# Patient Record
Sex: FEMALE | Race: BLACK OR AFRICAN AMERICAN | NOT HISPANIC OR LATINO | URBAN - METROPOLITAN AREA
[De-identification: names, ages, dates, MRNs, and addresses within clinical notes are randomized per-mention and may not be internally consistent; named-entity substitution may affect disease eponyms.]

---

## 2019-01-03 ENCOUNTER — EMERGENCY (EMERGENCY)
Facility: HOSPITAL | Age: 34
LOS: 1 days | Discharge: HOME | End: 2019-01-03
Attending: EMERGENCY MEDICINE | Admitting: EMERGENCY MEDICINE
Payer: SELF-PAY

## 2019-01-03 VITALS
DIASTOLIC BLOOD PRESSURE: 74 MMHG | HEART RATE: 80 BPM | RESPIRATION RATE: 18 BRPM | SYSTOLIC BLOOD PRESSURE: 119 MMHG | OXYGEN SATURATION: 99 % | WEIGHT: 187.17 LBS | TEMPERATURE: 99 F

## 2019-01-03 DIAGNOSIS — R07.89 OTHER CHEST PAIN: ICD-10-CM

## 2019-01-03 DIAGNOSIS — M54.5 LOW BACK PAIN: ICD-10-CM

## 2019-01-03 PROCEDURE — 71046 X-RAY EXAM CHEST 2 VIEWS: CPT | Mod: 26

## 2019-01-03 PROCEDURE — 71046 X-RAY EXAM CHEST 2 VIEWS: CPT

## 2019-01-03 PROCEDURE — 99284 EMERGENCY DEPT VISIT MOD MDM: CPT

## 2019-01-03 PROCEDURE — 99283 EMERGENCY DEPT VISIT LOW MDM: CPT

## 2019-01-03 RX ORDER — ESOMEPRAZOLE MAGNESIUM 40 MG/1
1 CAPSULE, DELAYED RELEASE ORAL
Qty: 14 | Refills: 0 | OUTPATIENT
Start: 2019-01-03 | End: 2019-01-16

## 2019-01-03 RX ORDER — FAMOTIDINE 10 MG/ML
20 INJECTION INTRAVENOUS ONCE
Qty: 0 | Refills: 0 | Status: COMPLETED | OUTPATIENT
Start: 2019-01-03 | End: 2019-01-03

## 2019-01-03 RX ORDER — IBUPROFEN 200 MG
600 TABLET ORAL ONCE
Qty: 0 | Refills: 0 | Status: COMPLETED | OUTPATIENT
Start: 2019-01-03 | End: 2019-01-03

## 2019-01-03 RX ADMIN — Medication 30 MILLILITER(S): at 19:59

## 2019-01-03 RX ADMIN — FAMOTIDINE 20 MILLIGRAM(S): 10 INJECTION INTRAVENOUS at 19:59

## 2019-01-03 RX ADMIN — Medication 600 MILLIGRAM(S): at 19:59

## 2019-01-03 NOTE — ED PROVIDER NOTE - ATTENDING CONTRIBUTION TO CARE
Pt w/ no sig PMHx p/w burning in the center of her chest s/p drinking soda 2 days ago, intermittent, w/ associated epigastric discomfort. No n/v. No f/c. No diarrhea or constipation. No SOB, diaphoresis, or palpitations. No recent travel / immobilization / surgery. No hx PE / DVT / CA. Former smoker. Pt w/ no sig PMHx p/w burning in the center of her chest s/p drinking soda 2 days ago, intermittent, w/ associated epigastric discomfort. No n/v. No f/c. No diarrhea or constipation. No SOB, diaphoresis, or palpitations. No recent travel / immobilization / surgery. No hx PE / DVT / CA. No hemoptysis, LE edema or calf ttp. No exogenous hormone use. Former smoker. No hx CAD. No drug use. No sig FHx CAD or sudden death.   Constitutional: Well appearing, well nourished, awake, alert, oriented to person, place, time/situation and in no apparent distress.  ENMT: Airway patent. Normal MM  Eyes: Clear bilaterally  Cardiac: Normal rate, regular rhythm.  Heart sounds S1, S2.  No murmurs, rubs or gallops. No JVD or LE edema  Respiratory: Breaths sounds equal and clear b/l. No increased WOB, tachypnea, hypoxia, or accessory mm use. Pt speaks in full sentences.   Musculoskeletal: Range of motion is not limited. NO LE edema or calf ttp.   Neuro: Alert and oriented x 3, face symmetric and speech fluent. Strength 5/5 x 4 ext and symmetric, nml gross motor movement, nml gait. No focal deficits noted.  Skin: Skin normal color for race, warm, dry and intact. No evidence of rash.  Psych: Alert and oriented to person, place, time/situation. normal mood and affect. no apparent risk to self or others.   Burning epigastric / chest discomfort c/w gastritis / dyspepsia / PUD. No EKG changes. No risk factors for ACS. PERC neg / [place] low risk. PPI, diet modifications, f/u PCP

## 2019-01-03 NOTE — ED PROVIDER NOTE - OBJECTIVE STATEMENT
34 y/o female with no sig PMHx c/o burning to chest x 2 days. pt states intermittent burning to chest began after drinking soda 2 days ago. pt notes slight epigastric discomfort at times. no fever or chills. no n./v/d. no diarrhea or constipation,. no leg pain or swelling.  no sob or cough. Also pt notes low back pain past 1 wk. no trauma. no numbness, tingling or weakness. no radiation of pain. no incontinence. no further complaints.

## 2019-01-03 NOTE — ED PROVIDER NOTE - MEDICAL DECISION MAKING DETAILS
burning to chest s/p soda. ? GERD. pt well appearing. lungs clear. VSS. ecg no acute changes. cxr no acute pathology. sx's improved with pepcid and maalox. hcg negative. back pain likely muscular,. neuro exam intact. no indication for imaging. pain meds, warm compresses and f/u with pmd

## 2019-01-03 NOTE — ED ADULT NURSE NOTE - NSIMPLEMENTINTERV_GEN_ALL_ED
Implemented All Universal Safety Interventions:  [place] to call system. Call bell, personal items and telephone within reach. Instruct patient to call for assistance. Room bathroom lighting operational. Non-slip footwear when patient is off stretcher. Physically safe environment: no spills, clutter or unnecessary equipment. Stretcher in lowest position, wheels locked, appropriate side rails in place.

## 2019-01-03 NOTE — ED PROVIDER NOTE - MUSCULOSKELETAL, MLM
Spine appears normal, range of motion is not limited, no muscle or joint tenderness strength 5/5 b/l LE. distal sensation intact.

## 2019-01-03 NOTE — ED PROVIDER NOTE - CARE PROVIDER_API CALL
Maverick Cool), Medicine  [address] [facility] [address]  [place] [address]  Phone: [phone]  Fax: [phone]

## 2021-05-20 ENCOUNTER — EMERGENCY (EMERGENCY)
Facility: HOSPITAL | Age: 36
LOS: 1 days | Discharge: HOME | End: 2021-05-20
Admitting: EMERGENCY MEDICINE
Payer: MEDICAID

## 2021-05-20 VITALS
HEART RATE: 85 BPM | HEIGHT: 64 IN | OXYGEN SATURATION: 98 % | SYSTOLIC BLOOD PRESSURE: 138 MMHG | DIASTOLIC BLOOD PRESSURE: 85 MMHG | RESPIRATION RATE: 16 BRPM | TEMPERATURE: 98 F | WEIGHT: 179.9 LBS

## 2021-05-20 DIAGNOSIS — R10.32 LEFT LOWER QUADRANT PAIN: ICD-10-CM

## 2021-05-20 DIAGNOSIS — N83.202 UNSPECIFIED OVARIAN CYST, LEFT SIDE: ICD-10-CM

## 2021-05-20 PROCEDURE — 76830 TRANSVAGINAL US NON-OB: CPT

## 2021-05-20 PROCEDURE — 76856 US EXAM PELVIC COMPLETE: CPT | Mod: 26

## 2021-05-20 PROCEDURE — 76856 US EXAM PELVIC COMPLETE: CPT

## 2021-05-20 PROCEDURE — 99285 EMERGENCY DEPT VISIT HI MDM: CPT

## 2021-05-20 PROCEDURE — 76830 TRANSVAGINAL US NON-OB: CPT | Mod: 26

## 2021-05-20 PROCEDURE — 99284 EMERGENCY DEPT VISIT MOD MDM: CPT | Mod: 25

## 2021-05-20 NOTE — ED PROVIDER NOTE - NSFOLLOWUPINSTRUCTIONS_ED_ALL_ED_FT
Can take tylenol 650mg or motrin 600mg every 6hrs as needed for pain.    Stay well hydrated.    Return for fevers, persistent vomit, uncontrolled OR worsening pain, worsening breathing, worsening lightheaded.    Follow up with primary doctor within 1-2 days.     Follow up with your OBGYN.     Ovarian Cyst    An ovarian cyst is a fluid-filled sac that forms on an ovary. The ovaries are small organs that produce eggs in women. Various types of cysts can form on the ovaries. Some may cause symptoms and require treatment. Most ovarian cysts go away on their own, are not cancerous (are benign), and do not cause problems.    Common types of ovarian cysts include:  Functional (follicle) cysts.  Occur during the menstrual cycle, and usually go away with the next menstrual cycle if you do not get pregnant.  Usually cause no symptoms.  Endometriomas.  Are cysts that form from the tissue that lines the uterus (endometrium).  Are sometimes called “chocolate cysts” because they become filled with blood that turns brown.  Can cause pain in the lower abdomen during intercourse and during your period.  Cystadenoma cysts.  Develop from cells on the outside surface of the ovary.  Can get very large and cause lower abdomen pain and pain with intercourse.  Can cause severe pain if they twist or break open (rupture).  Dermoid cysts.  Are sometimes found in both ovaries.  May contain different kinds of body tissue, such as skin, teeth, hair, or cartilage.  Usually do not cause symptoms unless they get very big.  Theca lutein cysts.  Occur when too much of a certain hormone (human chorionic gonadotropin) is produced and overstimulates the ovaries to produce an egg.  Are most common after having procedures used to assist with the conception of a baby (in vitro fertilization).    What are the causes?  Ovarian cysts may be caused by:  Ovarian hyperstimulation syndrome. This is a condition that can develop from taking fertility medicines. It causes multiple large ovarian cysts to form.  Polycystic ovarian syndrome (PCOS). This is a common hormonal disorder that can cause ovarian cysts, as well as problems with your period or fertility.    What increases the risk?  The following factors may make you more likely to develop ovarian cysts:  Being overweight or obese.  Taking fertility medicines.  Taking certain forms of hormonal birth control.  Smoking.    What are the signs or symptoms?  Many ovarian cysts do not cause symptoms. If symptoms are present, they may include:  Pelvic pain or pressure.  Pain in the lower abdomen.  Pain during sex.  Abdominal swelling.  Abnormal menstrual periods.  Increasing pain with menstrual periods.    How is this diagnosed?  These cysts are commonly found during a routine pelvic exam. You may have tests to find out more about the cyst, such as:  Ultrasound.  X-ray of the pelvis.  CT scan.  MRI.  Blood tests.    How is this treated?  Many ovarian cysts go away on their own without treatment. Your health care provider may want to check your cyst regularly for 2–3 months to see if it changes. If you are in menopause, it is especially important to have your cyst monitored closely because menopausal women have a higher rate of ovarian cancer.    When treatment is needed, it may include:  Medicines to help relieve pain.  A procedure to drain the cyst (aspiration).  Surgery to remove the whole cyst.  Hormone treatment or birth control pills. These methods are sometimes used to help dissolve a cyst.  Follow these instructions at home:  Take over-the-counter and prescription medicines only as told by your health care provider.  Do not drive or use heavy machinery while taking prescription pain medicine.  Get regular pelvic exams and Pap tests as often as told by your health care provider.  Return to your normal activities as told by your health care provider. Ask your health care provider what activities are safe for you.  Do not use any products that contain nicotine or tobacco, such as cigarettes and e-cigarettes. If you need help quitting, ask your health care provider.  Keep all follow-up visits as told by your health care provider. This is important.  Contact a health care provider if:  Your periods are late, irregular, or painful, or they stop.  You have pelvic pain that does not go away.  You have pressure on your bladder or trouble emptying your bladder completely.  You have pain during sex.  You have any of the following in your abdomen:  A feeling of fullness.  Pressure.  Discomfort.  Pain that does not go away.  Swelling.  You feel generally ill.  You become constipated.  You lose your appetite.  You develop severe acne.  You start to have more body hair and facial hair.  You are gaining weight or losing weight without changing your exercise and eating habits.  You think you may be pregnant.  Get help right away if:  You have abdominal pain that is severe or gets worse.  You cannot eat or drink without vomiting.  You suddenly develop a fever.  Your menstrual period is much heavier than usual.

## 2021-05-20 NOTE — ED PROVIDER NOTE - PROGRESS NOTE DETAILS
Arsh: received s/o from ANEESH sanders pending US. Pt currently pain free. prelim US showing "No evidence of ovarian torsion. Mildly enlarged left ovary containing a 3.5 cm hemorrhagic cyst."  Clinically no indication for further emergent ED workup or hospitalization at this time. Comfortable for dc, outpt f/u.

## 2021-05-20 NOTE — ED PROVIDER NOTE - CLINICAL SUMMARY MEDICAL DECISION MAKING FREE TEXT BOX
37 y/o F with no pertinent PmHx presents to the ED c/o left lower back pain and LLQ adnexal pain. Well-appearing on exam. Plan to check for pregnancy. Will use pelvic ultrasound to rule out ectopic pregnancy, ovarian cyst, and ovarian torsion. 37 y/o F with no pertinent PmHx presents to the ED c/o left LLQ/left adnexal pain radiaiting to her lower back, concerned about possible pregnancy. Pt late with her menstruation this month.  Well-appearing on exam, no signs of acute surgical abdomen, no GI symptoms, +left adnexal tenderness.  UCG- negative. pending US  to rule out ovarian cyst/ ovarian torsion.

## 2021-05-20 NOTE — ED ADULT NURSE NOTE - OBJECTIVE STATEMENT
Pt AOX4. Pt c/o  left sided lower back pain that started 3 days ago. Pt denies hematuria, dysuria, n/v, SOB, chest pain, weakness, numbness, constipation, diarrhea. Pt speaking in full complete sentences. Respirations even and unlabored. Abdomen nontender on palpation.

## 2021-05-20 NOTE — ED PROVIDER NOTE - OBJECTIVE STATEMENT
37 y/o F with no pertinent PmHx presents to the ED c/o left lower back pain and LLQ adnexal pain. Missed period, and her at home pregnancy test was question tara positive. Her gynecologist referred her for a pelvic ultrasound, but it's not scheduled yet. The pain worsened over the past three days so she came to the ED. No f/c/n/v/urinary/vag discharge. Seen by GYN last week, all tests done. 37 y/o F with no pertinent PmHx presents to the ED c/o left lower back pain and LLQ adnexal pain. Missed period, and her at home pregnancy test was question tara positive. Her gynecologist referred her for a pelvic ultrasound, but it's not scheduled yet. The pain worsened over the past three days so she came to the ED. No fever, chills, nausea, vomiting, urinary symptoms, or abnormal vaginal discharge. Seen by GYN last week, all tests done. 37 y/o F with no pertinent PmHx presents to the ED c/o left lower back pain and LLQ adnexal pain. Missed period, and her at home pregnancy test was ?positive, reports one line faint.  Her gynecologist referred her for a pelvic ultrasound, but it's not scheduled yet. The pain worsened over the past three days so she came to the ED. No fever, chills, nausea, vomiting, urinary symptoms, or abnormal vaginal discharge. Seen by GYN last week, all tests done. 37 y/o F with no pertinent PmHx presents to the ED c/o left lower back pain and LLQ adnexal pain. Missed period, and her at home pregnancy test was ?positive, reports noted one faint line.  Her gynecologist referred her for a pelvic ultrasound, but it's not scheduled yet. The pain worsened over the past three days so she came to the ED. No fever, chills, nausea, vomiting, urinary symptoms, or abnormal vaginal discharge. Seen by GYN last week, all tests done.

## 2021-05-20 NOTE — ED ADULT NURSE NOTE - NSIMPLEMENTINTERV_GEN_ALL_ED
Implemented All Universal Safety Interventions:  [place] to call system. Call bell, personal items and telephone within reach. Instruct patient to call for assistance. Room bathroom lighting operational. Non-slip footwear when patient is off stretcher. Physically safe environment: no spills, clutter or unnecessary equipment. Stretcher in lowest position, wheels locked, appropriate side rails in place.

## 2021-05-20 NOTE — ED PROVIDER NOTE - PHYSICAL EXAMINATION
CONSTITUTIONAL: Well-developed; well-nourished; in no acute distress.  SKIN: Warm and dry, no acute rash.  HEAD: Normocephalic; atraumatic.  EYES: PERRL, EOM intact; conjunctiva and sclera clear.  ENT: No nasal discharge; airway clear.  NECK: Supple; non tender.  CARD: S1, S2 normal; no murmurs, gallops, or rubs. Regular rate and rhythm.  RESP: No wheezes, rales or rhonchi.  ABD: Mild LLQ tenderness. Normal bowel sounds; soft; non-distended; no hepatosplenomegaly.  EXT: Normal ROM. No clubbing, cyanosis or edema.  LYMPH: No acute cervical adenopathy.  NEURO: Alert, oriented. Grossly unremarkable.  PSYCH: Cooperative, appropriate. CONSTITUTIONAL: Well-developed; well-nourished; in no acute distress.  SKIN: Warm and dry, no acute rash.  HEAD: Normocephalic; atraumatic.  EYES: PERRL, EOM intact; conjunctiva and sclera clear.  ENT: No nasal discharge; airway clear.  NECK: Supple; non tender.  CARD:  no murmurs, gallops, or rubs. Regular rate and rhythm.  RESP: No wheezes, rales or rhonchi.  ABD: Mild LLQ tenderness. Normal bowel sounds; soft; non-distended;   EXT: Normal ROM. No clubbing, cyanosis or edema.  NEURO: Alert, oriented. Grossly unremarkable.  PSYCH: Cooperative, appropriate.

## 2021-05-20 NOTE — ED PROVIDER NOTE - PATIENT PORTAL LINK FT
You can access the FollowMyHealth Patient Portal offered by [facility] by registering at the following website: [email or web address]. By joining [email or web address] FollowMyHealth portal, you will also be able to view your health information using other applications (apps) compatible with our system.

## 2021-05-20 NOTE — ED ADULT TRIAGE NOTE - CHIEF COMPLAINT QUOTE
Pt c/o back pain on left side that wraps around to front x2 days. Ambulates with steady gait, denies any N/V/D or hematuria. Denies injury.

## 2021-06-28 ENCOUNTER — EMERGENCY (EMERGENCY)
Facility: HOSPITAL | Age: 36
LOS: 1 days | Discharge: HOME | End: 2021-06-28
Attending: EMERGENCY MEDICINE | Admitting: EMERGENCY MEDICINE
Payer: MEDICAID

## 2021-06-28 VITALS
HEART RATE: 85 BPM | RESPIRATION RATE: 18 BRPM | OXYGEN SATURATION: 99 % | DIASTOLIC BLOOD PRESSURE: 74 MMHG | TEMPERATURE: 99 F | SYSTOLIC BLOOD PRESSURE: 116 MMHG

## 2021-06-28 VITALS
OXYGEN SATURATION: 100 % | DIASTOLIC BLOOD PRESSURE: 75 MMHG | RESPIRATION RATE: 18 BRPM | SYSTOLIC BLOOD PRESSURE: 126 MMHG | TEMPERATURE: 100 F | HEART RATE: 90 BPM | HEIGHT: 64 IN | WEIGHT: 184.97 LBS

## 2021-06-28 DIAGNOSIS — R10.32 LEFT LOWER QUADRANT PAIN: ICD-10-CM

## 2021-06-28 DIAGNOSIS — Z20.822 CONTACT WITH AND (SUSPECTED) EXPOSURE TO COVID-19: ICD-10-CM

## 2021-06-28 DIAGNOSIS — D64.9 ANEMIA, UNSPECIFIED: ICD-10-CM

## 2021-06-28 DIAGNOSIS — N83.202 UNSPECIFIED OVARIAN CYST, LEFT SIDE: ICD-10-CM

## 2021-06-28 LAB
ALBUMIN SERPL ELPH-MCNC: 4.5 G/DL — SIGNIFICANT CHANGE UP (ref 3.3–5)
ALP SERPL-CCNC: 88 U/L — SIGNIFICANT CHANGE UP (ref 40–120)
ALT FLD-CCNC: 16 U/L — SIGNIFICANT CHANGE UP (ref 10–45)
ANION GAP SERPL CALC-SCNC: 9 MMOL/L — SIGNIFICANT CHANGE UP (ref 5–17)
ANISOCYTOSIS BLD QL: SIGNIFICANT CHANGE UP
APPEARANCE UR: CLEAR — SIGNIFICANT CHANGE UP
AST SERPL-CCNC: 25 U/L — SIGNIFICANT CHANGE UP (ref 10–40)
BACTERIA # UR AUTO: PRESENT /HPF
BASOPHILS # BLD AUTO: 0 K/UL — SIGNIFICANT CHANGE UP (ref 0–0.2)
BASOPHILS NFR BLD AUTO: 0 % — SIGNIFICANT CHANGE UP (ref 0–2)
BILIRUB SERPL-MCNC: 0.3 MG/DL — SIGNIFICANT CHANGE UP (ref 0.2–1.2)
BILIRUB UR-MCNC: NEGATIVE — SIGNIFICANT CHANGE UP
BUN SERPL-MCNC: 9 MG/DL — SIGNIFICANT CHANGE UP (ref 7–23)
CALCIUM SERPL-MCNC: 8.7 MG/DL — SIGNIFICANT CHANGE UP (ref 8.4–10.5)
CHLORIDE SERPL-SCNC: 103 MMOL/L — SIGNIFICANT CHANGE UP (ref 96–108)
CO2 SERPL-SCNC: 22 MMOL/L — SIGNIFICANT CHANGE UP (ref 22–31)
COLOR SPEC: YELLOW — SIGNIFICANT CHANGE UP
COMMENT - URINE: SIGNIFICANT CHANGE UP
CREAT SERPL-MCNC: 1.08 MG/DL — SIGNIFICANT CHANGE UP (ref 0.5–1.3)
DIFF PNL FLD: NEGATIVE — SIGNIFICANT CHANGE UP
EOSINOPHIL # BLD AUTO: 0.28 K/UL — SIGNIFICANT CHANGE UP (ref 0–0.5)
EOSINOPHIL NFR BLD AUTO: 3.6 % — SIGNIFICANT CHANGE UP (ref 0–6)
EPI CELLS # UR: ABNORMAL /HPF (ref 0–5)
GIANT PLATELETS BLD QL SMEAR: PRESENT — SIGNIFICANT CHANGE UP
GLUCOSE SERPL-MCNC: 124 MG/DL — HIGH (ref 70–99)
GLUCOSE UR QL: NEGATIVE — SIGNIFICANT CHANGE UP
HCT VFR BLD CALC: 30.4 % — LOW (ref 34.5–45)
HGB BLD-MCNC: 8.3 G/DL — LOW (ref 11.5–15.5)
HYPOCHROMIA BLD QL: SLIGHT — SIGNIFICANT CHANGE UP
KETONES UR-MCNC: NEGATIVE — SIGNIFICANT CHANGE UP
LACTATE SERPL-SCNC: 0.8 MMOL/L — SIGNIFICANT CHANGE UP (ref 0.5–2)
LEUKOCYTE ESTERASE UR-ACNC: NEGATIVE — SIGNIFICANT CHANGE UP
LIDOCAIN IGE QN: 35 U/L — SIGNIFICANT CHANGE UP (ref 7–60)
LYMPHOCYTES # BLD AUTO: 1.83 K/UL — SIGNIFICANT CHANGE UP (ref 1–3.3)
LYMPHOCYTES # BLD AUTO: 23.2 % — SIGNIFICANT CHANGE UP (ref 13–44)
MANUAL SMEAR VERIFICATION: SIGNIFICANT CHANGE UP
MCHC RBC-ENTMCNC: 17.1 PG — LOW (ref 27–34)
MCHC RBC-ENTMCNC: 27.3 GM/DL — LOW (ref 32–36)
MCV RBC AUTO: 62.8 FL — LOW (ref 80–100)
MICROCYTES BLD QL: SIGNIFICANT CHANGE UP
MONOCYTES # BLD AUTO: 0.49 K/UL — SIGNIFICANT CHANGE UP (ref 0–0.9)
MONOCYTES NFR BLD AUTO: 6.2 % — SIGNIFICANT CHANGE UP (ref 2–14)
NEUTROPHILS # BLD AUTO: 5.29 K/UL — SIGNIFICANT CHANGE UP (ref 1.8–7.4)
NEUTROPHILS NFR BLD AUTO: 67 % — SIGNIFICANT CHANGE UP (ref 43–77)
NITRITE UR-MCNC: NEGATIVE — SIGNIFICANT CHANGE UP
PH UR: 6 — SIGNIFICANT CHANGE UP (ref 5–8)
PLAT MORPH BLD: ABNORMAL
PLATELET # BLD AUTO: 320 K/UL — SIGNIFICANT CHANGE UP (ref 150–400)
POIKILOCYTOSIS BLD QL AUTO: SLIGHT — SIGNIFICANT CHANGE UP
POLYCHROMASIA BLD QL SMEAR: SIGNIFICANT CHANGE UP
POTASSIUM SERPL-MCNC: 3.8 MMOL/L — SIGNIFICANT CHANGE UP (ref 3.5–5.3)
POTASSIUM SERPL-SCNC: 3.8 MMOL/L — SIGNIFICANT CHANGE UP (ref 3.5–5.3)
PROT SERPL-MCNC: 7.6 G/DL — SIGNIFICANT CHANGE UP (ref 6–8.3)
PROT UR-MCNC: ABNORMAL MG/DL
RBC # BLD: 4.84 M/UL — SIGNIFICANT CHANGE UP (ref 3.8–5.2)
RBC # FLD: 20.2 % — HIGH (ref 10.3–14.5)
RBC BLD AUTO: ABNORMAL
RBC CASTS # UR COMP ASSIST: < 5 /HPF — SIGNIFICANT CHANGE UP
SARS-COV-2 RNA SPEC QL NAA+PROBE: SIGNIFICANT CHANGE UP
SCHISTOCYTES BLD QL AUTO: SLIGHT — SIGNIFICANT CHANGE UP
SODIUM SERPL-SCNC: 134 MMOL/L — LOW (ref 135–145)
SP GR SPEC: >=1.03 — SIGNIFICANT CHANGE UP (ref 1–1.03)
UROBILINOGEN FLD QL: 0.2 E.U./DL — SIGNIFICANT CHANGE UP
WBC # BLD: 7.89 K/UL — SIGNIFICANT CHANGE UP (ref 3.8–10.5)
WBC # FLD AUTO: 7.89 K/UL — SIGNIFICANT CHANGE UP (ref 3.8–10.5)
WBC UR QL: < 5 /HPF — SIGNIFICANT CHANGE UP

## 2021-06-28 PROCEDURE — 76856 US EXAM PELVIC COMPLETE: CPT | Mod: 26,59

## 2021-06-28 PROCEDURE — 99284 EMERGENCY DEPT VISIT MOD MDM: CPT | Mod: 25

## 2021-06-28 PROCEDURE — 83605 ASSAY OF LACTIC ACID: CPT

## 2021-06-28 PROCEDURE — U0005: CPT

## 2021-06-28 PROCEDURE — 99284 EMERGENCY DEPT VISIT MOD MDM: CPT

## 2021-06-28 PROCEDURE — 76830 TRANSVAGINAL US NON-OB: CPT

## 2021-06-28 PROCEDURE — 87086 URINE CULTURE/COLONY COUNT: CPT

## 2021-06-28 PROCEDURE — 76830 TRANSVAGINAL US NON-OB: CPT | Mod: 26

## 2021-06-28 PROCEDURE — 83690 ASSAY OF LIPASE: CPT

## 2021-06-28 PROCEDURE — 76856 US EXAM PELVIC COMPLETE: CPT

## 2021-06-28 PROCEDURE — 87591 N.GONORRHOEAE DNA AMP PROB: CPT

## 2021-06-28 PROCEDURE — U0003: CPT

## 2021-06-28 PROCEDURE — 81001 URINALYSIS AUTO W/SCOPE: CPT

## 2021-06-28 PROCEDURE — 36415 COLL VENOUS BLD VENIPUNCTURE: CPT

## 2021-06-28 PROCEDURE — 85025 COMPLETE CBC W/AUTO DIFF WBC: CPT

## 2021-06-28 PROCEDURE — 87491 CHLMYD TRACH DNA AMP PROBE: CPT

## 2021-06-28 PROCEDURE — 80053 COMPREHEN METABOLIC PANEL: CPT

## 2021-06-28 NOTE — ED PROVIDER NOTE - PHYSICAL EXAMINATION
CONSTITUTIONAL: Well-developed; well-nourished; in no acute distress.  SKIN: Warm and dry, no acute rash.  HEAD: Normocephalic; atraumatic.  EYES: PERRL, EOM intact; conjunctiva and sclera clear.  ENT: Airway patent.  NECK: Supple; non tender.  CARD: No murmurs, gallops, or rubs. Regular rate and rhythm.  RESP: Lungs CTA.  ABD: Suprapubic pain and left lower abdominal pain noted. Normal bowel sounds; soft; non-distended; non-tender; no hepatosplenomegaly. No CVAT  EXT: Normal ROM. No clubbing, cyanosis or edema.  LYMPH: No acute cervical adenopathy.  NEURO: Alert, oriented. Grossly unremarkable.  PSYCH: Cooperative, appropriate. CONSTITUTIONAL: Well-developed; well-nourished; in no acute distress.  SKIN: Warm and dry, no acute rash.  HEAD: Normocephalic; atraumatic.  EYES: PERRL, EOM intact; conjunctiva and sclera clear.  ENT: Airway patent.  NECK: Supple; non tender.  CARD: No murmurs, gallops, or rubs. Regular rate and rhythm.  RESP: Lungs CTA.  ABD: Suprapubic pain and left lower abdominal pain noted. Normal bowel sounds; soft; non-distended; non-tender; no hepatosplenomegaly. No CVAT.  G/U: Normal external genitalia. No cervical motion tenderness. Normal right adnexal. + left adnexal TTP, + yellowish creamy vaginal discharge present.  EXT: Normal ROM. No clubbing, cyanosis or edema.  LYMPH: No acute cervical adenopathy.  NEURO: Alert, oriented. Grossly unremarkable.  PSYCH: Cooperative, appropriate. CONSTITUTIONAL: Well-developed; well-nourished; in no acute distress.  SKIN: Warm and dry, no acute rash.  HEAD: Normocephalic; atraumatic.  EYES: PERRL, EOM intact; conjunctiva and sclera clear.  ENT: Airway patent.  NECK: Supple; non tender.  CARD: No murmurs, gallops, or rubs. Regular rate and rhythm.  RESP: Lungs CTA.  ABD: Suprapubic pain and left lower abdominal pain noted. Normal bowel sounds; soft; non-distended;  no hepatosplenomegaly. No CVAT.  G/U: Normal external genitalia. No cervical motion tenderness. Normal right adnexal. + left adnexal TTP, + yellowish creamy vaginal discharge present.  EXT: Normal ROM. No clubbing, cyanosis or edema.  LYMPH: No acute cervical adenopathy.  NEURO: Alert, oriented. Grossly unremarkable.  PSYCH: Cooperative, appropriate.

## 2021-06-28 NOTE — ED PROVIDER NOTE - NSFOLLOWUPINSTRUCTIONS_ED_ALL_ED_FT
OVARIAN CYST - AfterCare(R) Instructions(ER/ED)           Ovarian Cyst    WHAT YOU NEED TO KNOW:    An ovarian cyst is a fluid-filled sac that grows in or on an ovary. You have 2 ovaries, 1 on each side of your uterus. They are small, about the shape of an almond. Ovarian cysts are common in women who have regular monthly cycles. During your monthly cycle, eggs are released from the ovaries. The cyst usually contains fluid but may sometimes have blood or tissue in it. Most ovarian cysts are harmless and go away without treatment in a few months. Some cysts can grow large, cause pain, or break open.     Female Reproductive System         DISCHARGE INSTRUCTIONS:    Call your local emergency number (911 in the [place]) if:   •You have severe pain with fever and vomiting.      •You have sudden, severe abdominal pain.      •You are too weak, faint, or dizzy to stand up.      •You are breathing very quickly.      Call your doctor or gynecologist if:   •Your periods are early, late, or more painful than usual.      •You have questions or concerns about your condition or care.      Medicines: You may need any of the following:   •Birth control pills may help control your monthly cycle, prevent cysts, or cause them to shrink.      •Acetaminophen decreases pain and fever. It is available without a doctor's order. Ask how much to take and how often to take it. Follow directions. Read the labels of all other medicines you are using to see if they also contain acetaminophen, or ask your doctor or pharmacist. Acetaminophen can cause liver damage if not taken correctly. Do not use more than 4 grams (4,000 milligrams) total of acetaminophen in one day.       •NSAIDs, such as ibuprofen, help decrease swelling, pain, and fever. This medicine is available with or without a doctor's order. NSAIDs can cause stomach bleeding or kidney problems in certain people. If you take blood thinner medicine, always ask your healthcare provider if NSAIDs are safe for you. Always read the medicine label and follow directions.      •Prescription pain medicine may be given. Ask your healthcare provider how to take this medicine safely. Some prescription pain medicines contain acetaminophen. Do not take other medicines that contain acetaminophen without talking to your healthcare provider. Too much acetaminophen may cause liver damage. Prescription pain medicine may cause constipation. Ask your healthcare provider how to prevent or treat constipation.       •Take your medicine as directed. Contact your healthcare provider if you think your medicine is not helping or if you have side effects. Tell him or her if you are allergic to any medicine. Keep a list of the medicines, vitamins, and herbs you take. Include the amounts, and when and why you take them. Bring the list or the pill bottles to follow-up visits. Carry your medicine list with you in case of an emergency.      Manage ovarian cysts: You can manage a current cyst and help healthcare providers find future cysts early.  •Apply heat to decrease pain and cramping from a cyst. Sit in a warm bath, or place a heating pad (turned on low) on your abdomen. Do this for 15 to 20 minutes every hour for comfort.      •Get regular pelvic exams or Pap smears. This will help providers find any new ovarian cysts. Tell your healthcare provider about any unusual changes in your monthly cycle.      Follow up with your doctor or gynecologist as directed: Write down your questions so you remember to ask them during your visits.       © Copyright [employer] 2021           back to top                          © Copyright [employer] 2021

## 2021-06-28 NOTE — ED PROVIDER NOTE - CLINICAL SUMMARY MEDICAL DECISION MAKING FREE TEXT BOX
Patient with neg HCG with low h/h and negative UA. Pelvic sonogram pending results. Recommend continue iron supplements OTC and f/u with gyn. Patient well appearing, NAD and VSS. No need for further eval in ED.

## 2021-06-28 NOTE — ED ADULT NURSE NOTE - OBJECTIVE STATEMENT
36y female c/o lower abdominal and back pain x 2 days. pt reports a dull pain. denies PMH. abdomen soft and nondistended. normal PO intake. pt denies n/v/d, urinary sx, blood in stool, CP, SOB, headache, dizziness. 20G R Ac placed. labs sent.

## 2021-06-28 NOTE — ED PROVIDER NOTE - ATTENDING CONTRIBUTION TO CARE
36 F co L sided abd pain- started 3 days ago- lower abd rad to back  no f/n/v/d no gu sx  no ho kidney stones/infection  ho ovarian cyst  vss  s1s2 lungs cta bl  abd + suprapubic ttp L sided ttp- mild +bs  ext no c/c/e  IMP- L sided pain  labs, hydration

## 2021-06-30 LAB
C TRACH RRNA SPEC QL NAA+PROBE: SIGNIFICANT CHANGE UP
CULTURE RESULTS: NO GROWTH — SIGNIFICANT CHANGE UP
N GONORRHOEA RRNA SPEC QL NAA+PROBE: SIGNIFICANT CHANGE UP
SPECIMEN SOURCE: SIGNIFICANT CHANGE UP
SPECIMEN SOURCE: SIGNIFICANT CHANGE UP

## 2021-11-16 NOTE — ED ADULT NURSE NOTE - RECENT EXPOSURE TO
"-- DO NOT REPLY / DO NOT REPLY ALL --  -- Message is from the [employer]--    General Patient Message      Reason for Call: patient is calling because she wants to provide the correct fax number which is [phone] to send her Ct scan to [place] to the office of Dr. Carol Gordon, and attention to [facility]. Please call back to confirm once it has been faxed. The patient did get an appoint with Dr. Natacha Norman and she is having a procedure done 11/18, Thursday at [address] [place] [address] - [facility]. Caller Information       Type Contact Phone    11/16/2021 01:09 PM CST Phone (Incoming) Gresham Kay (Self) [phone] (M)          Alternative phone number: na    Turnaround time given to caller: ""This message will be sent to [facility] Provider's name]. The clinical team will fulfill your request as soon as they review your message. \""    " none known

## 2021-11-18 ENCOUNTER — EMERGENCY (EMERGENCY)
Facility: HOSPITAL | Age: 36
LOS: 1 days | Discharge: HOME | End: 2021-11-18
Attending: EMERGENCY MEDICINE | Admitting: EMERGENCY MEDICINE
Payer: MEDICAID

## 2021-11-18 VITALS
DIASTOLIC BLOOD PRESSURE: 68 MMHG | HEART RATE: 96 BPM | OXYGEN SATURATION: 98 % | WEIGHT: 169.98 LBS | SYSTOLIC BLOOD PRESSURE: 109 MMHG | HEIGHT: 64 IN | TEMPERATURE: 98 F | RESPIRATION RATE: 16 BRPM

## 2021-11-18 VITALS
RESPIRATION RATE: 17 BRPM | SYSTOLIC BLOOD PRESSURE: 120 MMHG | HEART RATE: 87 BPM | OXYGEN SATURATION: 98 % | DIASTOLIC BLOOD PRESSURE: 80 MMHG | TEMPERATURE: 98 F

## 2021-11-18 DIAGNOSIS — Z20.822 CONTACT WITH AND (SUSPECTED) EXPOSURE TO COVID-19: ICD-10-CM

## 2021-11-18 DIAGNOSIS — R10.32 LEFT LOWER QUADRANT PAIN: ICD-10-CM

## 2021-11-18 DIAGNOSIS — D64.9 ANEMIA, UNSPECIFIED: ICD-10-CM

## 2021-11-18 DIAGNOSIS — Z87.19 PERSONAL HISTORY OF OTHER DISEASES OF THE DIGESTIVE SYSTEM: ICD-10-CM

## 2021-11-18 LAB
ALBUMIN SERPL ELPH-MCNC: 4.4 G/DL — SIGNIFICANT CHANGE UP (ref 3.3–5)
ALP SERPL-CCNC: 89 U/L — SIGNIFICANT CHANGE UP (ref 40–120)
ALT FLD-CCNC: 13 U/L — SIGNIFICANT CHANGE UP (ref 10–45)
ANION GAP SERPL CALC-SCNC: 7 MMOL/L — SIGNIFICANT CHANGE UP (ref 5–17)
ANISOCYTOSIS BLD QL: SLIGHT — SIGNIFICANT CHANGE UP
APPEARANCE UR: CLEAR — SIGNIFICANT CHANGE UP
AST SERPL-CCNC: 17 U/L — SIGNIFICANT CHANGE UP (ref 10–40)
BASOPHILS # BLD AUTO: 0.06 K/UL — SIGNIFICANT CHANGE UP (ref 0–0.2)
BASOPHILS NFR BLD AUTO: 0.9 % — SIGNIFICANT CHANGE UP (ref 0–2)
BILIRUB SERPL-MCNC: 0.4 MG/DL — SIGNIFICANT CHANGE UP (ref 0.2–1.2)
BILIRUB UR-MCNC: NEGATIVE — SIGNIFICANT CHANGE UP
BUN SERPL-MCNC: 5 MG/DL — LOW (ref 7–23)
CALCIUM SERPL-MCNC: 9.4 MG/DL — SIGNIFICANT CHANGE UP (ref 8.4–10.5)
CHLORIDE SERPL-SCNC: 106 MMOL/L — SIGNIFICANT CHANGE UP (ref 96–108)
CO2 SERPL-SCNC: 26 MMOL/L — SIGNIFICANT CHANGE UP (ref 22–31)
COLOR SPEC: YELLOW — SIGNIFICANT CHANGE UP
CREAT SERPL-MCNC: 0.99 MG/DL — SIGNIFICANT CHANGE UP (ref 0.5–1.3)
DIFF PNL FLD: NEGATIVE — SIGNIFICANT CHANGE UP
EOSINOPHIL # BLD AUTO: 0.27 K/UL — SIGNIFICANT CHANGE UP (ref 0–0.5)
EOSINOPHIL NFR BLD AUTO: 4.4 % — SIGNIFICANT CHANGE UP (ref 0–6)
GIANT PLATELETS BLD QL SMEAR: PRESENT — SIGNIFICANT CHANGE UP
GLUCOSE SERPL-MCNC: 103 MG/DL — HIGH (ref 70–99)
GLUCOSE UR QL: NEGATIVE — SIGNIFICANT CHANGE UP
HCT VFR BLD CALC: 31.4 % — LOW (ref 34.5–45)
HGB BLD-MCNC: 9.2 G/DL — LOW (ref 11.5–15.5)
HYPOCHROMIA BLD QL: SIGNIFICANT CHANGE UP
KETONES UR-MCNC: ABNORMAL MG/DL
LEUKOCYTE ESTERASE UR-ACNC: NEGATIVE — SIGNIFICANT CHANGE UP
LIDOCAIN IGE QN: 34 U/L — SIGNIFICANT CHANGE UP (ref 7–60)
LYMPHOCYTES # BLD AUTO: 1.14 K/UL — SIGNIFICANT CHANGE UP (ref 1–3.3)
LYMPHOCYTES # BLD AUTO: 18.4 % — SIGNIFICANT CHANGE UP (ref 13–44)
MACROCYTES BLD QL: SLIGHT — SIGNIFICANT CHANGE UP
MANUAL SMEAR VERIFICATION: SIGNIFICANT CHANGE UP
MCHC RBC-ENTMCNC: 20.1 PG — LOW (ref 27–34)
MCHC RBC-ENTMCNC: 29.3 GM/DL — LOW (ref 32–36)
MCV RBC AUTO: 68.6 FL — LOW (ref 80–100)
MICROCYTES BLD QL: SLIGHT — SIGNIFICANT CHANGE UP
MONOCYTES # BLD AUTO: 0.71 K/UL — SIGNIFICANT CHANGE UP (ref 0–0.9)
MONOCYTES NFR BLD AUTO: 11.4 % — SIGNIFICANT CHANGE UP (ref 2–14)
NEUTROPHILS # BLD AUTO: 4.04 K/UL — SIGNIFICANT CHANGE UP (ref 1.8–7.4)
NEUTROPHILS NFR BLD AUTO: 64.9 % — SIGNIFICANT CHANGE UP (ref 43–77)
NITRITE UR-MCNC: NEGATIVE — SIGNIFICANT CHANGE UP
OVALOCYTES BLD QL SMEAR: SLIGHT — SIGNIFICANT CHANGE UP
PH UR: 6 — SIGNIFICANT CHANGE UP (ref 5–8)
PLAT MORPH BLD: ABNORMAL
PLATELET # BLD AUTO: 263 K/UL — SIGNIFICANT CHANGE UP (ref 150–400)
POLYCHROMASIA BLD QL SMEAR: SLIGHT — SIGNIFICANT CHANGE UP
POTASSIUM SERPL-MCNC: 3.7 MMOL/L — SIGNIFICANT CHANGE UP (ref 3.5–5.3)
POTASSIUM SERPL-SCNC: 3.7 MMOL/L — SIGNIFICANT CHANGE UP (ref 3.5–5.3)
PROT SERPL-MCNC: 7.3 G/DL — SIGNIFICANT CHANGE UP (ref 6–8.3)
PROT UR-MCNC: NEGATIVE MG/DL — SIGNIFICANT CHANGE UP
RBC # BLD: 4.58 M/UL — SIGNIFICANT CHANGE UP (ref 3.8–5.2)
RBC # FLD: 17 % — HIGH (ref 10.3–14.5)
RBC BLD AUTO: ABNORMAL
SARS-COV-2 RNA SPEC QL NAA+PROBE: SIGNIFICANT CHANGE UP
SCHISTOCYTES BLD QL AUTO: SLIGHT — SIGNIFICANT CHANGE UP
SODIUM SERPL-SCNC: 139 MMOL/L — SIGNIFICANT CHANGE UP (ref 135–145)
SP GR SPEC: 1.02 — SIGNIFICANT CHANGE UP (ref 1–1.03)
SPHEROCYTES BLD QL SMEAR: SLIGHT — SIGNIFICANT CHANGE UP
UROBILINOGEN FLD QL: 0.2 E.U./DL — SIGNIFICANT CHANGE UP
WBC # BLD: 6.22 K/UL — SIGNIFICANT CHANGE UP (ref 3.8–10.5)
WBC # FLD AUTO: 6.22 K/UL — SIGNIFICANT CHANGE UP (ref 3.8–10.5)

## 2021-11-18 PROCEDURE — 87591 N.GONORRHOEAE DNA AMP PROB: CPT

## 2021-11-18 PROCEDURE — U0005: CPT

## 2021-11-18 PROCEDURE — 76856 US EXAM PELVIC COMPLETE: CPT

## 2021-11-18 PROCEDURE — 83690 ASSAY OF LIPASE: CPT

## 2021-11-18 PROCEDURE — 36415 COLL VENOUS BLD VENIPUNCTURE: CPT

## 2021-11-18 PROCEDURE — U0003: CPT

## 2021-11-18 PROCEDURE — 81003 URINALYSIS AUTO W/O SCOPE: CPT

## 2021-11-18 PROCEDURE — 99285 EMERGENCY DEPT VISIT HI MDM: CPT

## 2021-11-18 PROCEDURE — 76830 TRANSVAGINAL US NON-OB: CPT | Mod: 26

## 2021-11-18 PROCEDURE — 85025 COMPLETE CBC W/AUTO DIFF WBC: CPT

## 2021-11-18 PROCEDURE — 80053 COMPREHEN METABOLIC PANEL: CPT

## 2021-11-18 PROCEDURE — 99284 EMERGENCY DEPT VISIT MOD MDM: CPT | Mod: 25

## 2021-11-18 PROCEDURE — 76830 TRANSVAGINAL US NON-OB: CPT

## 2021-11-18 PROCEDURE — 76856 US EXAM PELVIC COMPLETE: CPT | Mod: 26

## 2021-11-18 PROCEDURE — 87491 CHLMYD TRACH DNA AMP PROBE: CPT

## 2021-11-18 PROCEDURE — 87086 URINE CULTURE/COLONY COUNT: CPT

## 2021-11-18 RX ORDER — SODIUM CHLORIDE 9 MG/ML
1000 INJECTION INTRAMUSCULAR; INTRAVENOUS; SUBCUTANEOUS ONCE
Refills: 0 | Status: COMPLETED | OUTPATIENT
Start: 2021-11-18 | End: 2021-11-18

## 2021-11-18 RX ORDER — CEFTRIAXONE 500 MG/1
500 INJECTION, POWDER, FOR SOLUTION INTRAMUSCULAR; INTRAVENOUS ONCE
Refills: 0 | Status: DISCONTINUED | OUTPATIENT
Start: 2021-11-18 | End: 2021-11-18

## 2021-11-18 RX ORDER — KETOROLAC TROMETHAMINE 30 MG/ML
30 SYRINGE (ML) INJECTION ONCE
Refills: 0 | Status: DISCONTINUED | OUTPATIENT
Start: 2021-11-18 | End: 2021-11-18

## 2021-11-18 RX ORDER — FLUCONAZOLE 150 MG/1
150 TABLET ORAL ONCE
Refills: 0 | Status: COMPLETED | OUTPATIENT
Start: 2021-11-18 | End: 2021-11-18

## 2021-11-18 RX ADMIN — SODIUM CHLORIDE 1000 MILLILITER(S): 9 INJECTION INTRAMUSCULAR; INTRAVENOUS; SUBCUTANEOUS at 17:02

## 2021-11-18 RX ADMIN — FLUCONAZOLE 150 MILLIGRAM(S): 150 TABLET ORAL at 17:46

## 2021-11-18 NOTE — ED PROVIDER NOTE - CLINICAL SUMMARY MEDICAL DECISION MAKING FREE TEXT BOX
37 y/o F with PMHx left sided ovarian cyst presents to ED with 3 days of left lower abdominal pain that radiates to left flank. On exam, left suprapubic and left pelvic areas are tender to palpation. Significant thick white discharge seen. Pt was offered treatment for gonorrhea and clamydia but refused until results come. Pt was also offered treatment for yeast infection, which she treated. Plan for transvaginal US to r/o ovarian pathology for pain secondary to pt history.     ED Course:  Vital signs noted. Pt is afebrile in ED and all other VS within normal limits. 35 y/o F with PMHx left sided ovarian cyst presents to ED with 3 days of left lower abdominal pain that radiates to left flank. On exam, left suprapubic and left pelvic areas are tender to palpation. Significant thick white discharge seen. Pt was offered treatment for gonorrhea and clamydia but refused until results come back. Pt was also offered treatment for yeast infection, which she accepted. Plan for transvaginal US to r/o ovarian pathology for pain secondary to pt history.     ED Course:  Vital signs noted. Pt is afebrile in ED and all other VS within normal limits. Urine preg negative and UA with NAD. Labs noted - pt with anemia- has hx of this, otherwise with NAD. Pelvic u/s with no ovarian torsion/ pathology- thickened endometrium stripe noted. Pt t f/up outpt with Gyn. Feeling better post IVF and pain meds. Return precautions given.

## 2021-11-18 NOTE — ED PROVIDER NOTE - OBJECTIVE STATEMENT
37 y/o F with PMHx of left sided ovarian cyst in past (no surgeries needed) and no previous history of abdominal surgeries, presents to ED with 3 days of left sided lower abdominal pain that is radiating to left flank. Pain is described as sharp and intermittently worsens. Nothing exacerbates or relieves the pain. Denies nausea, vomiting, fevers, chills, vaginal discharge or unusual vaginal bleeding, or urinary symptoms. LNP was Oct 1st (no menses this month) and is sexually active with one male with no contraceptives. Last BM was nonbloody this morning. Pt is has had one dose of the COVID-19 vaccine (Pfizer). 37 y/o F with PMHx of anemia and left sided ovarian cyst in past (no surgeries needed) and no previous history of abdominal surgeries, presents to ED with 3 days of left sided lower abdominal pain that is radiating to left flank. Pain is described as sharp and intermittently worsens. Nothing exacerbates or relieves the pain. Denies nausea, vomiting, fevers, chills, vaginal discharge or unusual vaginal bleeding, or urinary symptoms. LNP was Oct 1st (no menses this month) and is sexually active with one male with no contraceptives. Last BM was nonbloody this morning. Pt is has had one dose of the COVID-19 vaccine (Pfizer). 35 y/o F with PMHx of anemia and hx of left sided ovarian cyst in the past (no surgeries needed) and no previous history of other abdominal surgeries, presents to ED with 3 days of left sided lower abdominal pain that is radiating to left flank. Pain is described as sharp and intermittently worsens. Nothing exacerbates or relieves the pain. Denies nausea, vomiting, fevers, chills, vaginal discharge or unusual vaginal bleeding, or urinary symptoms. LNP was Oct 1st (no menses this month) and is sexually active with one male with no contraceptives. Last BM was nonbloody this morning. Pt is has had one dose of the COVID-19 vaccine (Pfizer).

## 2021-11-18 NOTE — ED ADULT TRIAGE NOTE - CHIEF COMPLAINT QUOTE
Pt CO Left sided Abd Pain.  Ambulating with steady gait, speaking in complete sentences, well appearing.

## 2021-11-18 NOTE — ED PROVIDER NOTE - NSFOLLOWUPINSTRUCTIONS_ED_ALL_ED_FT
Please follow up with your primary care doctor in 2-3 days.   Please follow up with your Gynecologist in a week.  Return to the ER if you develop any concerning symptoms.     Abdominal Pain    Many things can cause abdominal pain. Many times, abdominal pain is not caused by a disease and will improve without treatment. Your health care provider will do a physical exam to determine if there is a dangerous cause of your pain; blood tests and imaging may help determine the cause of your pain. However, in many cases, no cause may be found and you may need further testing as an outpatient. Monitor your abdominal pain for any changes.     SEEK IMMEDIATE MEDICAL CARE IF YOU HAVE ANY OF THE FOLLOWING SYMPTOMS: worsening abdominal pain, uncontrollable vomiting, profuse diarrhea, inability to have bowel movements or pass gas, black or bloody stools, fever accompanying chest pain or back pain, or fainting. These symptoms may represent a serious problem that is an emergency. Do not wait to see if the symptoms will go away. Get medical help right away. Call 911 and do not drive yourself to the hospital.        Vaginal Discharge    WHAT YOU NEED TO KNOW:    Vaginal discharge is normal. It is usually clear or white and odorless. Vaginal discharge is your body's way of cleaning your vagina so it is healthy. Irritation, itching, burning, or a change in the amount, smell, or color may indicate a problem.    DISCHARGE INSTRUCTIONS:    Contact your healthcare provider or gynecologist if:   •You have swelling, burning, itching, or irritation in or around your vagina.      •You have an increase in the amount of discharge.      •The color or smell of your discharge changes.      •Your discharge looks similar to cottage cheese.      •Your discharge is bloody and it is not your monthly period.      •You have pain during sexual intercourse.      •You have trouble urinating, or you urinate often and with urgency.      •You have abdominal pain or cramps.      •You have a fever or chills.      •You have low back pain or side pain.      •You have questions or concerns about your condition or care.      Keep your vagina healthy:   •Always wipe from front to back after you use the toilet. This prevents spreading bacteria from your rectal area into your vagina.       •Clean in and around your vagina with mild soap and warm water each day. Gently dry the area after washing. Do not use hot tubs. The heat and moisture from hot tubs can increase your risk for another yeast infection.      •Do not wear tight-fitting clothes or undergarments for long periods. Wear cotton underwear during the day. Cotton helps keep your genital area dry and does not hold in warmth or moisture. Do not wear underwear at night.      •Change your laundry soap or fabric softener if you think it is irritating your skin.      •Do not douche or use feminine hygiene sprays or bubble bath. Do not use pads or tampons that are scented, or colored or perfumed toilet paper.      •Ask your healthcare provider about birth control options if necessary. Condoms have latex and diaphragms have gel that kill sperm. Both of these may irritate your genital area.      Follow up with your doctor as directed: Write down your questions so you remember to ask them during your visits.        © Copyright [employer] 2021

## 2021-11-18 NOTE — ED PROVIDER NOTE - NSFOLLOWUPCLINICS_GEN_ALL_ED_FT
[facility]  Family Medicine  [address]  [place], [place] [address]  Phone: [phone]  Fax:   Follow Up Time: 4-6 Days

## 2021-11-18 NOTE — ED PROVIDER NOTE - CARE PROVIDER_API CALL
[place] [place]  OBSTETRICS AND GYNECOLOGY  [address] [place] [id number]  Phone: [phone]  Fax: [phone]  Follow Up Time:

## 2021-11-18 NOTE — ED PROVIDER NOTE - PHYSICAL EXAMINATION
CONSTITUTIONAL: Overweight, well appearing, awake, alert, oriented and in no apparent distress.  ENMT: Airway patent.  EYES: Clear bilaterally.  CARDIAC: Normal rate, regular rhythm.  Heart sounds S1, S2.   RESPIRATORY: Breath sounds clear and equal bilaterally.  GASTROINTESTINAL: Abdomen soft, non-tender, no guarding. Tenderness to palpation of the left suprapubic and pelvic area.   : Significant amounts of white, thick discharge. No external rash. No CMT.   MUSCULOSKELETAL: Spine appears normal, range of motion is not limited, no muscle or joint tenderness  NEUROLOGICAL: Alert and oriented, no focal deficits, no motor or sensory deficits.  SKIN: Skin normal color for race, warm, dry and intact. No evidence of rash.  PSYCHIATRIC: Alert and oriented. normal mood and affect. no apparent risk to self or others. CONSTITUTIONAL: Overweight, well appearing, awake, alert, oriented and in no apparent distress.  ENMT: Airway patent.  EYES: Clear bilaterally.  CARDIAC: Normal rate, regular rhythm.  Heart sounds S1, S2.   RESPIRATORY: Breath sounds clear and equal bilaterally.  GASTROINTESTINAL: Abdomen soft, non-tender, no guarding. Tenderness to palpation of the left suprapubic and pelvic area.   : Significant amounts of white, thick discharge. No external rash. No CMT.   MUSCULOSKELETAL: Spine appears normal, range of motion is not limited, no muscle or joint tenderness  NEUROLOGICAL: Alert and oriented, no focal deficits   SKIN: Skin normal color for race, warm, dry and intact. No evidence of rash.  PSYCHIATRIC: Alert and oriented. normal mood and affect. no apparent risk to self or others.

## 2021-11-18 NOTE — ED PROVIDER NOTE - PATIENT PORTAL LINK FT
You can access the FollowMyHealth Patient Portal offered by [facility] by registering at the following website: [email or web address]. By joining [employer]’s FollowMyHealth portal, you will also be able to view your health information using other applications (apps) compatible with our system.

## 2021-11-18 NOTE — ED ADULT NURSE NOTE - OBJECTIVE STATEMENT
Pt c/o L sided abdominal pain, hx of ovarian cysts. Denies bleeding or discharge. Denies fevers, N/V/D.

## 2021-11-19 LAB
C TRACH RRNA SPEC QL NAA+PROBE: SIGNIFICANT CHANGE UP
CULTURE RESULTS: NO GROWTH — SIGNIFICANT CHANGE UP
N GONORRHOEA RRNA SPEC QL NAA+PROBE: SIGNIFICANT CHANGE UP
N GONORRHOEA RRNA SPEC QL NAA+PROBE: SIGNIFICANT CHANGE UP
SPECIMEN SOURCE: SIGNIFICANT CHANGE UP

## 2022-04-06 NOTE — ED ADULT NURSE NOTE - ED CARDIAC CAPILLARY REFILL
No need to wake up in the middle the night.  Come back if symptoms worsen or if there are new symptoms of concern.  Follow-up with pediatrician in a couple of days if still having any issues.  Avoid physical activity the next few days, and also tomorrow avoid all screens.  
2 seconds or less

## 2022-06-02 NOTE — ED ADULT NURSE NOTE - BREATHING
Detail Level: Simple
Additional Notes: Patient consent was obtained to proceed with the visit and recommended plan of care after discussion of all risks and benefits, including the risks of COVID-19 exposure.
spontaneous/unlabored

## 2023-09-05 NOTE — ED ADULT NURSE NOTE - NSFALLRSKUNASSIST_ED_ALL_ED
"Chief Complaint   Patient presents with    Vaginal Bleeding       Initial LMP 08/02/2023 (Approximate)   Breastfeeding No  Estimated body mass index is 21.63 kg/m  as calculated from the following:    Height as of 5/30/23: 1.651 m (5' 5\").    Weight as of 7/27/23: 59 kg (130 lb).    Patient presents to the clinic using No DME    Is there anyone who you would like to be able to receive your results? No  If yes have patient fill out [facility]RITA      " no

## 2023-12-13 NOTE — ED ADULT NURSE NOTE - CAS TRG GEN SKIN COLOR
Patient has new insurance and new mail order pharmacy her scripts need to go to .    Normal for race